# Patient Record
Sex: FEMALE | Race: WHITE | NOT HISPANIC OR LATINO | Employment: OTHER | URBAN - METROPOLITAN AREA
[De-identification: names, ages, dates, MRNs, and addresses within clinical notes are randomized per-mention and may not be internally consistent; named-entity substitution may affect disease eponyms.]

---

## 2018-01-08 ENCOUNTER — HOSPITAL ENCOUNTER (EMERGENCY)
Facility: HOSPITAL | Age: 38
Discharge: HOME | End: 2018-01-08
Attending: EMERGENCY MEDICINE
Payer: MEDICAID

## 2018-01-08 VITALS
OXYGEN SATURATION: 99 % | TEMPERATURE: 98 F | SYSTOLIC BLOOD PRESSURE: 111 MMHG | HEIGHT: 68 IN | WEIGHT: 220 LBS | RESPIRATION RATE: 14 BRPM | BODY MASS INDEX: 33.34 KG/M2 | DIASTOLIC BLOOD PRESSURE: 65 MMHG | HEART RATE: 63 BPM

## 2018-01-08 DIAGNOSIS — M54.41 ACUTE BILATERAL LOW BACK PAIN WITH BILATERAL SCIATICA: Primary | ICD-10-CM

## 2018-01-08 DIAGNOSIS — M54.42 ACUTE BILATERAL LOW BACK PAIN WITH BILATERAL SCIATICA: Primary | ICD-10-CM

## 2018-01-08 PROCEDURE — 25000003 PHARM REV CODE 250: Performed by: PHYSICIAN ASSISTANT

## 2018-01-08 PROCEDURE — 99283 EMERGENCY DEPT VISIT LOW MDM: CPT

## 2018-01-08 RX ORDER — GABAPENTIN 100 MG/1
100 CAPSULE ORAL 3 TIMES DAILY
Qty: 30 CAPSULE | Refills: 0 | Status: SHIPPED | OUTPATIENT
Start: 2018-01-08 | End: 2018-01-18

## 2018-01-08 RX ORDER — METHOCARBAMOL 500 MG/1
500 TABLET, FILM COATED ORAL
Status: COMPLETED | OUTPATIENT
Start: 2018-01-08 | End: 2018-01-08

## 2018-01-08 RX ORDER — GABAPENTIN 300 MG/1
300 CAPSULE ORAL
Status: COMPLETED | OUTPATIENT
Start: 2018-01-08 | End: 2018-01-08

## 2018-01-08 RX ORDER — METHOCARBAMOL 500 MG/1
500 TABLET, FILM COATED ORAL 3 TIMES DAILY
Qty: 30 TABLET | Refills: 0 | Status: SHIPPED | OUTPATIENT
Start: 2018-01-08 | End: 2018-01-18

## 2018-01-08 RX ORDER — IBUPROFEN 800 MG/1
800 TABLET ORAL 3 TIMES DAILY
COMMUNITY

## 2018-01-08 RX ADMIN — GABAPENTIN 300 MG: 300 CAPSULE ORAL at 10:01

## 2018-01-08 RX ADMIN — METHOCARBAMOL 500 MG: 500 TABLET ORAL at 10:01

## 2018-01-08 NOTE — DISCHARGE INSTRUCTIONS
Call and schedule an appointment with your primary care provider or a back surgeon.  Continue to take tylenol or motrin as needed for pain.  You can also take the prescribed pain medications.  For worsening symptoms, chest pain, shortness of breath, increased abdominal pain, high grade fever, stroke or stroke like symptoms, immediately go to the nearest Emergency Room or call 911 as soon as possible.

## 2018-01-08 NOTE — ED PROVIDER NOTES
Limited to Encounter Date: 1/8/2018       History     Chief Complaint   Patient presents with    Back Pain     low back pain with numbness bilat thighs and tingling bilat lower legs since last pm.     Patient is a 37 year old female who presents with back pain and numbness to bilateral lower extremities. She reports PMH significant for asthma. She reports she was diagnosed with three bulging disc in [place] in 2012. She reports she was getting steroid injections at that time but moved about three months ago. She reports last night she developed numbness of bilateral thighs and tingling to bilateral lower legs. She is able to ambulate. She states she took two doses of ibuprofen with no relief. She denied new injury. She denied loss of bowel/bladder control. She denied difficutly urinary or urinary retention.       The history is provided by the patient and the spouse.     Review of patient's allergies indicates:   Allergen Reactions    Percocet [oxycodone-acetaminophen] Itching    Ativan [lorazepam] Rash    Darvocet a500 [propoxyphene n-acetaminophen] Rash    Lorcet (hydrocodone) [hydrocodone-acetaminophen] Rash    Toradol [ketorolac] Rash     Past Medical History:   Diagnosis Date    Arthritis     Asthma      Past Surgical History:   Procedure Laterality Date    CHOLECYSTECTOMY      HYSTERECTOMY       History reviewed. No pertinent family history.  Social History   Substance Use Topics    Smoking status: Never Smoker    Smokeless tobacco: Not on file    Alcohol use No     Review of Systems   Constitutional: Negative for activity change, appetite change, chills and fever.   HENT: Negative for congestion, rhinorrhea and sore throat.    Respiratory: Negative for cough, chest tightness and shortness of breath.    Cardiovascular: Negative for chest pain.   Gastrointestinal: Negative for abdominal pain, diarrhea, nausea and vomiting.   Genitourinary: Negative for dysuria and frequency.   Musculoskeletal:  Positive for back pain. Negative for neck pain and neck stiffness.   Skin: Negative for rash.   Neurological: Positive for numbness. Negative for dizziness, syncope and headaches.       Physical Exam     Initial Vitals [01/08/18 0910]   BP Pulse Resp Temp SpO2   (!) 108/57 68 14 98.1 °F (36.7 °C) --      MAP       74         Physical Exam    Constitutional: Vital signs are normal. She appears well-developed and well-nourished. She is cooperative.  Non-toxic appearance. She does not have a sickly appearance.   HENT:   Head: Normocephalic and atraumatic.   Right Ear: External ear normal.   Left Ear: External ear normal.   Nose: Nose normal.   Mouth/Throat: Oropharynx is clear and moist.   Eyes: Conjunctivae and lids are normal. Pupils are equal, round, and reactive to light.   Neck: Normal range of motion and full passive range of motion without pain. Neck supple.   Cardiovascular: Normal rate and regular rhythm.   Pulmonary/Chest: Breath sounds normal. She has no wheezes. She has no rales.   Abdominal: Soft. Normal appearance. There is no tenderness. There is no rigidity, no rebound and no guarding.   Genitourinary: Rectal exam shows anal tone normal.   Genitourinary Comments: Normal rectal tone.   Musculoskeletal:        Lumbar back: She exhibits tenderness and bony tenderness. She exhibits normal range of motion, no swelling and no edema.   Diffuse tenderness to the lumbar spine with no step-off. No skin changes. Positive straight leg raise bilaterally. Pulses noted. Cap refill < 3 seconds.    Neurological: She is alert and oriented to person, place, and time.   Subjective decreased sensation to bilateral medial lateral thigh. With normal sensation to groin. Equal strength to bilateral lower extremities.    Skin: Skin is warm, dry and intact. No rash noted.         ED Course   Procedures  Labs Reviewed - No data to display          Medical Decision Making:   History:   Old Medical Records: I decided to obtain old  medical records.       APC / Resident Notes:   This is an emergent evaluation of a 37 year old with complaint of back pain. Patient reported history of bulging discs to the lumbar spine. She denied any new injury. She is able to able int he ED. Patient denied loss of bowel/bladder control. I considered but doubt acute fracture, cauda equina or epidural abscess. Patient is noted to have diffuse tenderness to palpation on exam. No  step-off. No fever noted. Patient with normal strength bilaterally to lower extremities. Normal rectal tone. I do not think radiographic imaging is warranted. I will treat their acute pain and given them a prescription for pain medication and muscle relaxer for symptomatic relief at home. Return precautions given. Patient was discussed with Dr. Willis ho is in agreement with the plan of care.           Attending Attestation:     Physician Attestation Statement for NP/PA:   I discussed this assessment and plan of this patient with the NP/PA, but I did not personally examine the patient. The face to face encounter was performed by the NP/PA.    Other NP/PA Attestation Additions:    History of Present Illness: 37-year-old female presented with a chief complaint of back pain and paresthesias.  No bowel or bladder incontinence, or urinary retention noted.    Medical Decision Making: Initial differential diagnosis included but not limited to sciatica, degenerative disc disease, and muscular skeletal pain.  I am in agreement with the physician assistant's  assessment, treatment, and plan of care.                 ED Course      Clinical Impression:   The encounter diagnosis was Acute bilateral low back pain with bilateral sciatica.                           Tianna Mcfarlane PA-C  01/08/18 0143       Dani Willis MD  01/08/18 [id number]